# Patient Record
Sex: FEMALE | URBAN - METROPOLITAN AREA
[De-identification: names, ages, dates, MRNs, and addresses within clinical notes are randomized per-mention and may not be internally consistent; named-entity substitution may affect disease eponyms.]

---

## 2023-11-22 ENCOUNTER — TELEPHONE (OUTPATIENT)
Dept: PERINATAL CARE | Facility: CLINIC | Age: 43
End: 2023-11-22

## 2023-11-22 NOTE — TELEPHONE ENCOUNTER
----- Message from CARISSA Lorenzo sent at 11/22/2023  9:24 AM EST -----  I sent 2 messages to schedule for preconception visit but don't see she was called.  Am I missing something (totally possible that I am!)  Lola

## 2023-11-22 NOTE — TELEPHONE ENCOUNTER
Call the Pt regarded the message from [id number] and YEHUDA  to schedule appt I requested her to call back our office at [phone]

## 2024-01-15 ENCOUNTER — TELEPHONE (OUTPATIENT)
Facility: HOSPITAL | Age: 44
End: 2024-01-15

## 2024-01-15 NOTE — TELEPHONE ENCOUNTER
Called patient to schedule MFM appointment, based on referral issued to [facility] by OB office.      Left voicemail requesting patient to call back and schedule appointment, with office number for return call [phone].